# Patient Record
Sex: FEMALE | Race: BLACK OR AFRICAN AMERICAN | Employment: PART TIME | URBAN - METROPOLITAN AREA
[De-identification: names, ages, dates, MRNs, and addresses within clinical notes are randomized per-mention and may not be internally consistent; named-entity substitution may affect disease eponyms.]

---

## 2017-03-24 ENCOUNTER — HOSPITAL ENCOUNTER (EMERGENCY)
Facility: CLINIC | Age: 32
Discharge: HOME | End: 2017-03-24
Attending: EMERGENCY MEDICINE | Admitting: EMERGENCY MEDICINE
Payer: MEDICAID

## 2017-03-24 VITALS
TEMPERATURE: 98 F | SYSTOLIC BLOOD PRESSURE: 118 MMHG | HEART RATE: 69 BPM | WEIGHT: 100 LBS | DIASTOLIC BLOOD PRESSURE: 75 MMHG | RESPIRATION RATE: 16 BRPM | OXYGEN SATURATION: 98 %

## 2017-03-24 DIAGNOSIS — R51.9 FACIAL PAIN, ACUTE: ICD-10-CM

## 2017-03-24 DIAGNOSIS — K08.89 PAIN, DENTAL: ICD-10-CM

## 2017-03-24 PROCEDURE — 99282 EMERGENCY DEPT VISIT SF MDM: CPT

## 2017-03-24 RX ORDER — HYDROCODONE BITARTRATE AND ACETAMINOPHEN 5; 325 MG/1; MG/1
1-2 TABLET ORAL EVERY 4 HOURS PRN
Qty: 15 TABLET | Refills: 0 | Status: SHIPPED | OUTPATIENT
Start: 2017-03-24

## 2017-03-24 RX ORDER — IBUPROFEN 200 MG
600 TABLET ORAL EVERY 8 HOURS PRN
Qty: 1 TABLET | Refills: 0 | Status: SHIPPED | OUTPATIENT
Start: 2017-03-24

## 2017-03-24 ASSESSMENT — ENCOUNTER SYMPTOMS: FEVER: 0

## 2017-03-24 NOTE — ED AVS SNAPSHOT
[facility] Emergency Department    [address]    [facility] [address]    Phone:  [phone]    Fax:  [phone]                                       Michelle Gerber   MRN: [id number]    Department:  [facility] Emergency Department   Date of Visit:  3/24/2017           After Visit Summary Signature Page     I have received my discharge instructions, and my questions have been answered. I have discussed any challenges I see with this plan with the nurse or doctor.    ..........................................................................................................................................  Patient/Patient Representative Signature      ..........................................................................................................................................  Patient Representative Print Name and Relationship to Patient    ..................................................               ................................................  Date                                            Time    ..........................................................................................................................................  Reviewed by Signature/Title    ...................................................              ..............................................  Date                                                            Time

## 2017-03-24 NOTE — DISCHARGE INSTRUCTIONS
Discharge Instructions  Dental Pain    You have been seen today for a toothache. Your pain may be caused by an exposed nerve, an infection (pulpitis), a root abscess, or other problems. You will need to see a dentist for a solution to your tooth problem. Emergency Department care is only to help control your problem until you can see a dentist.  Today, we did not find any sign that your toothache was caused by a serious condition, but sometimes symptoms develop over time and cannot be found during an emergency visit, so it is very important that you follow up with your dentist.      Return to the Emergency Department if:    You develop a fever over 101 degrees Fahrenheit    You can t open your mouth normally, can t move your tongue well, or can t swallow    You have new or increased swelling of your face or neck.    You develop drainage of pus or foul smelling material from around your tooth.  What can I do to help myself?    Take any antibiotic the doctor may have prescribed for you today.    Avoid very hot or very cold foods as both can cause pain.    Make an appointment to see a dentist as soon as possible. If you wish, we can provide you with a list of low-cost dental clinics.       Remember that you can always come back to the Emergency Department if you are not able to see your regular doctor in the amount of time listed above, if you get any new symptoms, or if there is anything that worries you.        Dental Resources  Name/Address/Phone Eligibility Hours Fee   [facility]  [address], [address]  [place] [address]  [phone] Anyone Call for appointment [facility]  [facility]  Private Insurance   [facility]  Hygiene [facility]  [facility] [place], [place] [id number] [phone] Anyone Call for appointment    [facility] refers to low-cost dental clinics for non-preventive care    Portuguese Interpreters available Prices start at   Adults        Cleaning $36-$160         X-Ray $20-40  Children        Cleaning $15        X-ray $10-20        Fluoride $10  Accepts cash, check or credit;  Does not take insurance or MA.   [facility]  [address] [place], [place]  [id number] [phone] Anyone Afternoons and evenings    September-May    Answers phones after 10 AM $30.00 per visit   ($15.00 per visit if 62 or older)   Preventive care.  Restoration care; sliding fee; MA   [facility]  [address] [place] [address] [phone] Children birth to age 18 and pregnant women    [facility] Residents without insurance will be asked to apply for Assured Care. M TH F 8:30 am - 5 pm  T W 8:30 am - 7 pm    30 locations metro wide    Call for appointment and to confirm hours. Sliding Fee  [facility]  Medical Assistance  Assured Access  Private Insurance    8 Languages Spoken   [facility] [address] [address]  [phone] Anyone Call for appointment Sliding Fee  Accept insurance, [place],   [place] and self-pay.  Call if no insurance.    All services provided.  Staff fluent in Hmong, Laotian, Frisian, Kiswahili, Greenlandic, Arabic, and Farsi.   [facility]  [address] [place], [place] [id number] [phone] Children  Adults in a walk in basis Mon - Fri. 8 - 5 pm       (closed 1-2 pm)  General Dentists & Hygienists  Private Dentists  Dentures Fees based on family size and income ranging from 40% - 100% payment by patient.   [facility]  [address] [place], [place]  [id number] [phone] Anyone Mon - Fri 7:30 am - 5:00 pm  By Appt.    Tues & Wed @ 3:00 call for urgent care Appt for next day service Sliding fee:  MA; Insurance   [facility]  [facility]  [address] [place], [place]  [id number] [phone] Anyone Call for an appointment.  Days open vary every semester. Adult cleaning $25  Child cleaning $15  X-Rays  $10-$15  Whitening services available  $75, includes cleaning  Seniors 50% off   [facility]  [address] [place], [place]  [id number] [phone] Anyone M-F 8 am - 5 pm Most insurances accepted.  MA and Sliding Scale.   [facility]  [facility] [place], [place]  [id number] [phone] Anyone without insurance Call to make appt   M-F 9:00 am - 5 pm   (Closed noon hour 12-1)    6 pm- 8 pm Evening hours also available for care Sliding fee based on income and size of household.   [facility]  Dental Clinic  [address] [place]  [address]  [phone] press option 1    For the [facility] press option 4 Anyone              Anyone Monday  4 - 6:30 pm  Tuesday 12:30 - 3 & 4-6:30  Thursday 8:30 - 11 am & 12-2:30 pm  September through May only    All year around on Thursdays from 5-9 pm (only time a dentist is in.) Cleanings & X-Rays Only  Cleanings:  Adults $30                   Kids $20  X-Rays:  Adults $34                Kids $10    MA and Sliding fee   [facility]  [address] [place], [place] [id number] [phone] Anyone    ([employer] interpreters available) M-F 8:00 am - 5:00 pm       By appointment only  (same day appointments available) Sliding fee ($40+ may be due at appointment, remainder billed); MA; Insurance   [facility]  [address] [place], [place] [phone]   Anyone    ([employer] interpreters available) M-Th 8:00 am - 8:00 pm  F 8:00 am - 5:00 pm    By appointment only  (same day appointments available) Sliding fee ($40+ may be due at appointment, remainder billed); MA; Insurance   [facility] [place]  [address] [place], [place]  [id number] [phone] Anyone    Must live within [facility] to qualify for sliding fee services Mon, Tues, Thurs, Fri  8:30 am - 5:00 pm  Wed. 8:30 am - 7:00 pm  All other services by  appt. only Sliding Fee; MA   Offer payment plans    Have financial workers that will assist with [place] and will use sliding fee for those who do not qualify.      [facility]  [address] [place] [address]  [phone] Anyone without insurance     Hours and day of week vary  (Call ahead for hours)    Walk-in only Free Services    Cleanings; Fillings; Extractions   [address]  [address] [phone] Anyone Call for an appointment Accept patients with [place] and Medical Assistance.  10% discount if bill is paid in full on day of service.  No sliding fee scale.     [facility]  [address] [place], [place] [phone] Anyone M-F  8 am-5 pm  Call for appt.    Walk-in hours 8 am - 11am and 1 pm - 5 pm, however take scheduled appointments first    No emergency services or oral surgeries Sliding Fee available with an [place] or [place] denial letter and proof of income.    Accepts Assured Access card and MA coverage.     Name/Address/Phone Eligibility Hours Fee   [facility]  [address] [place], [place]  [phone] Anyone with emergencies only M & W clinic begins at 6 pm   Call ahead    Alternate Fridays for children by Appt only Free   [facility]  [address] [place] [address]  [phone]    Emergencies (adults only):  [phone] Anyone Free walk-in screens for oral surgery    Call ahead for hours    All other services by appt. only  Accepts MA for pediatrics only    Rates are about 25% - 40% less than private dental office.    No sliding fee scale   [facility]  [address] [place], [place]  [phone] Anyone as long as they do not have health insurance Hours on Mondays, Tuesdays, and Thursdays Sliding fees based on monthly income    No root canals, tooth pulls or emergencies   [facility]  [address] [facility] [phone] Anyone  M - F 8:00 am - 5:00 pm  Wed 8:00 am - 8 pm Sliding fees; MA; Insurance   [facility]  [address] [place]  [address]  [phone] Anyone age 55+ M - F 8:00 am - 4:30 pm    Appt. only Set slightly lower fees;  MA; Insurance         Give Kids a smile day in [facility] Children Takes place in February at a few locations                          Dental Pain:      Dear Emergency Department Patient:     Here at [facility], we are always pleased to evaluate you for emergency conditions and offer a screening examination. Today, we have seen you and determined that you have dental pain and/or a dental problem.  We do not have the equipment and/or advanced training to perform definitive dental care, therefore, you need to be seen by a dentist for further care.     You may see your regular dentist if you have one, or we have attached a list of community dental providers, including some who provide care at a reduced fee.      Please be aware that if a narcotic medication was prescribed, it will not be refilled in the emergency department.  Accordingly, if you should have ongoing problems and/or pain, you should contact a dentist right away for definitive treatment.    Sincerely,        [facility]      Opioid Medication Discharge Instructions    You have been given a prescription for an opioid (narcotic) pain medicine and/or have   received a pain medicine while here in the emergency department. These medicines can make you drowsy or impaired.     You must not drive, operate dangerous equipment, or   engage in any other dangerous activities while taking these medications. If you drive while taking these medications, you could be arrested for DUI, or driving under the   influence. Do not drink any alcohol while you are taking these medications.     Opioid pain  medications can cause addiction. If you have a history of chemical   dependency of any type, you are at a higher risk of becoming addicted to pain   medications. Only take these prescribed medications to treat your pain when all other   options have been tried. Take it for as short a time and as few doses as possible.     Store your pain pills in a secure place, as they are frequently stolen and provide a dangerous opportunity for children or visitors in your house to start abusing these powerful medications. We will not replace any lost or stolen medicine.     As soon as your pain is better, you should safely dispose of all your remaining medication.     Many prescription pain medications contain Tylenol (acetaminophen), including Vicodin, Tylenol #3, Norco, Lortab, and Percocet. You should not take any extra pills of Tylenol if you are using these prescription medications or you can get very sick. Do not ever take more than 4000 mg of acetaminophen in any 24 hour period.    All opioids tend to cause constipation. Drink plenty of water and eat foods that have   a lot of fiber, such as fruits, vegetables, prune juice, apple juice and high fiber cereal.   Take a laxative if you don t move your bowels at least every other day. Miralax, Milk of   Magnesia, Colace, or Senna can be used to keep you regular.

## 2017-03-24 NOTE — ED AVS SNAPSHOT
[facility] Emergency Department    [address] [place] [address]    Phone:  [phone]    Fax:  [phone]                                       Michelle Gerber   MRN: [id number]    Department:  [facility] Emergency Department   Date of Visit:  3/24/2017           Patient Information     Date Of Birth          1985        Your diagnoses for this visit were:     Pain, dental        You were seen by Estela Sims MD.      Follow-up Information     Follow up with Dentist Today.        Discharge Instructions         Discharge Instructions  Dental Pain    You have been seen today for a toothache. Your pain may be caused by an exposed nerve, an infection (pulpitis), a root abscess, or other problems. You will need to see a dentist for a solution to your tooth problem. Emergency Department care is only to help control your problem until you can see a dentist.  Today, we did not find any sign that your toothache was caused by a serious condition, but sometimes symptoms develop over time and cannot be found during an emergency visit, so it is very important that you follow up with your dentist.      Return to the Emergency Department if:    You develop a fever over 101 degrees Fahrenheit    You can t open your mouth normally, can t move your tongue well, or can t swallow    You have new or increased swelling of your face or neck.    You develop drainage of pus or foul smelling material from around your tooth.  What can I do to help myself?    Take any antibiotic the doctor may have prescribed for you today.    Avoid very hot or very cold foods as both can cause pain.    Make an appointment to see a dentist as soon as possible. If you wish, we can provide you with a list of low-cost dental clinics.       Remember that you can always come back to the Emergency Department if you are not able to see your regular doctor in the amount of time listed above, if you get  any new symptoms, or if there is anything that worries you.        Dental Resources  Name/Address/Phone Eligibility Hours Fee   [facility]  [address]  [place] [address]  [phone] Anyone Call for appointment [facility]  Medical Assistance  Private Insurance   [facility]  [address] [place] [address]  [phone] Anyone Call for appointment    [place] refers to low-cost dental clinics for non-preventive care    Romansh Interpreters available Prices start at   Adults        Cleaning $36-$160        X-Ray $20-40  Children        Cleaning $15        X-ray $10-20        Fluoride $10  Accepts cash, check or credit;  Does not take insurance or MA.   [facility]  [address] [place], [place]  [phone] Anyone Afternoons and evenings    September-May    Answers phones after 10 AM $30.00 per visit   ($15.00 per visit if 62 or older)   Preventive care.  Restoration care; sliding fee; MA   [facility]  [address] [place] [address]  [phone] Children birth to age 18 and pregnant women    [facility] Residents without insurance will be asked to apply for Assured Care. M TH F 8:30 am - 5 pm  T W 8:30 am - 7 pm    30 locations metro wide    Call for appointment and to confirm hours. Sliding Fee  [facility]  Medical Assistance  Assured Access  Private Insurance    8 Languages Spoken   [facility] [address] [phone] Anyone Call for appointment Sliding Fee  Accept insurance, [place],   [place] and self-pay.  Call if no insurance.    All services provided.  Staff fluent in Hmong, Laotian, Kyrgyz, Cypriot, Maori, Romansh, and Farsi.   [facility]  [address] [place], [place] [phone] Children  Adults in a walk in basis Mon - Fri. 8 - 5 pm       (closed 1-2 pm)  General  Dentists & Hygienists  Private Dentists  Dentures Fees based on family size and income ranging from 40% - 100% payment by patient.   [facility]  [address] [place]  [address]    [phone] Anyone Mon - Fri 7:30 am - 5:00 pm  By Appt.    Tues & Wed @ 3:00 call for urgent care Appt for next day service Sliding fee:  MA; Insurance   [facility]  [address] [place]  [address]  [phone] Anyone Call for an appointment.  Days open vary every semester. Adult cleaning $25  Child cleaning $15  X-Rays $10-$15  Whitening services available  $75, includes cleaning  Seniors 50% off   [facility]  [address] [place]  [address] [phone] Anyone M-F 8 am - 5 pm Most insurances accepted.  MA and Sliding Scale.   [facility]  [address] [place] [address]  [phone] Anyone without insurance Call to make appt   M-F 9:00 am - 5 pm   (Closed noon hour 12-1)    6 pm- 8 pm Evening hours also available for care Sliding fee based on income and size of household.   [facility]  [address] [place] [address]  [phone] press option 1    For the [facility] press option 4 Anyone              Anyone Monday  4 - 6:30 pm  Tuesday 12:30 - 3 & 4-6:30  Thursday 8:30 - 11 am & 12-2:30 pm  September through May only    All year around on Thursdays from 5-9 pm (only time a dentist is in.) Cleanings & X-Rays Only  Cleanings:  Adults $30                   Kids $20  X-Rays:  Adults $34                Kids $10    MA and Sliding fee   [facility]  [address] [place] [address] [phone] Anyone    ([facility] interpreters available) M-F 8:00 am - 5:00 pm       By appointment only  (same day appointments available) Sliding fee ($40+ may be due at appointment, remainder billed); MA; Insurance   Open  [facility]  [address] [place] [address]    [phone]   Anyone    (Hmong interpreters available) M-Th 8:00 am - 8:00 pm  F 8:00 am - 5:00 pm    By appointment only  (same day appointments available) Sliding fee ($40+ may be due at appointment, remainder billed); MA; Insurance   [facility]  [address] [place], [place]  [address] [phone] Anyone    Must live within [facility] to qualify for sliding fee services Mon, Tues, Thurs, Fri  8:30 am - 5:00 pm  Wed. 8:30 am - 7:00 pm  All other services by appt. only Sliding Fee; MA   Offer payment plans    Have financial workers that will assist with [place] and will use sliding fee for those who do not qualify.      [facility]  [address] [place] [phone] Anyone without insurance     Hours and day of week vary  (Call ahead for hours)    Walk-in only Free Services    Cleanings; Fillings; Extractions   [facility]  [address] [place] [address]  [phone] Anyone Call for an appointment Accept patients with [place] and Medical Assistance.  10% discount if bill is paid in full on day of service.  No sliding fee scale.     [facility]  [address] [place] [address]  [phone] Anyone M-F  8 am-5 pm  Call for appt.    Walk-in hours 8 am - 11am and 1 pm - 5 pm, however take scheduled appointments first    No emergency services or oral surgeries Sliding Fee available with an [place] or [place] denial letter and proof of income.    Accepts Assured Access card and MA coverage.     Name/Address/Phone Eligibility Hours Fee   [facility]  [address] [place], [place]  [phone] Anyone with emergencies only M & W clinic begins at 6 pm   Call ahead    Alternate Fridays for children by Appt only Free   [facility]  [address] [place]  [address]  [phone]    Emergencies (adults only):  [phone] Anyone Free walk-in screens for oral surgery    Call ahead for hours    All other services by appt. only  Accepts MA for pediatrics only    Rates are about 25% - 40% less than private dental office.    No sliding fee scale   [facility]  [address] [place] [address]  [phone] Anyone as long as they do not have health insurance Hours on Mondays, Tuesdays, and Thursdays Sliding fees based on monthly income    No root canals, tooth pulls or emergencies   [facility]  [address] [place] [phone] Anyone  M - F 8:00 am - 5:00 pm  Wed 8:00 am - 8 pm Sliding fees; MA; Insurance   [facility] [address]  [phone] Anyone age 55+ M - F 8:00 am - 4:30 pm    Appt. only Set slightly lower fees;  MA; Insurance         Give Kids a smile day in [facility] Children Takes place in February at a few locations                          Dental Pain:      Dear Emergency Department Patient:     Here at [facility], we are always pleased to evaluate you for emergency conditions and offer a screening examination. Today, we have seen you and determined that you have dental pain and/or a dental problem.  We do not have the equipment and/or advanced training to perform definitive dental care, therefore, you need to be seen by a dentist for further care.     You may see your regular dentist if you have one, or we have attached a list of community dental providers, including some who provide care at a reduced fee.      Please be aware that if a narcotic medication was prescribed, it will not be refilled in the emergency department.  Accordingly, if you should have ongoing problems and/or pain, you should contact a dentist right away for definitive treatment.    Sincerely,        [facility]  ([facility])      Opioid Medication Discharge Instructions    You have been given a prescription for an opioid (narcotic) pain medicine and/or have   received a pain medicine while here in the emergency department. These medicines can make you drowsy or impaired.     You must not drive, operate dangerous equipment, or   engage in any other dangerous activities while taking these medications. If you drive while taking these medications, you could be arrested for DUI, or driving under the   influence. Do not drink any alcohol while you are taking these medications.     Opioid pain medications can cause addiction. If you have a history of chemical   dependency of any type, you are at a higher risk of becoming addicted to pain   medications. Only take these prescribed medications to treat your pain when all other   options have been tried. Take it for as short a time and as few doses as possible.     Store your pain pills in a secure place, as they are frequently stolen and provide a dangerous opportunity for children or visitors in your house to start abusing these powerful medications. We will not replace any lost or stolen medicine.     As soon as your pain is better, you should safely dispose of all your remaining medication.     Many prescription pain medications contain Tylenol (acetaminophen), including Vicodin, Tylenol #3, Norco, Lortab, and Percocet. You should not take any extra pills of Tylenol if you are using these prescription medications or you can get very sick. Do not ever take more than 4000 mg of acetaminophen in any 24 hour period.    All opioids tend to cause constipation. Drink plenty of water and eat foods that have   a lot of fiber, such as fruits, vegetables, prune juice, apple juice and high fiber cereal.   Take a laxative if you don t move your bowels at least every other day. Miralax, Milk of   Magnesia, Colace, or Senna can be used to keep you regular.          24 Hour Appointment Hotline       To  make an appointment at any [facility], call [phone] ([phone]). If you don't have a family doctor or clinic, we will help you find one. [facility] clinics are conveniently located to serve the needs of you and your family.             Review of your medicines      START taking        Dose / Directions Last dose taken    HYDROcodone-acetaminophen 5-325 MG per tablet   Commonly known as:  NORCO   Dose:  1-2 tablet   Quantity:  15 tablet        Take 1-2 tablets by mouth every 4 hours as needed for moderate to severe pain   Refills:  0        ibuprofen 200 MG tablet   Commonly known as:  ADVIL/MOTRIN   Dose:  600 mg   Quantity:  1 tablet        Take 3 tablets (600 mg) by mouth every 8 hours as needed for mild pain   Refills:  0          Our records show that you are taking the medicines listed below. If these are incorrect, please call your family doctor or clinic.        Dose / Directions Last dose taken    ferrous sulfate 325 (65 FE) MG tablet   Commonly known as:  IRON   Dose:  325 mg   Quantity:  100 tablet        Take 1 tablet (325 mg) by mouth 3 times daily (with meals)   Refills:  1        polyethylene glycol Packet   Commonly known as:  MIRALAX/GLYCOLAX   Dose:  17 g   Quantity:  60 packet        Take 17 g by mouth daily   Refills:  3        prenatal multivitamin  plus iron 27-0.8 MG Tabs per tablet   Dose:  1 tablet        Take 1 tablet by mouth daily   Refills:  0        senna-docusate 8.6-50 MG per tablet   Commonly known as:  SENOKOT-S;PERICOLACE   Dose:  1-2 tablet   Quantity:  60 tablet        Take 1-2 tablets by mouth 2 times daily   Refills:  2                Prescriptions were sent or printed at these locations (2 Prescriptions)                   Other Prescriptions                Printed at Department/Unit printer (2 of 2)         ibuprofen (ADVIL/MOTRIN) 200 MG tablet               HYDROcodone-acetaminophen (NORCO) 5-325 MG per tablet                Orders Needing Specimen Collection      None      Pending Results     No orders found from 3/22/2017 to 3/25/2017.            Pending Culture Results     No orders found from 3/22/2017 to 3/25/2017.             Test Results from your hospital stay            Clinical Quality Measure: Blood Pressure Screening     Your blood pressure was checked while you were in the emergency department today. The last reading we obtained was  BP: 136/78 . Please read the guidelines below about what these numbers mean and what you should do about them.  If your systolic blood pressure (the top number) is less than 120 and your diastolic blood pressure (the bottom number) is less than 80, then your blood pressure is normal. There is nothing more that you need to do about it.  If your systolic blood pressure (the top number) is 120-139 or your diastolic blood pressure (the bottom number) is 80-89, your blood pressure may be higher than it should be. You should have your blood pressure rechecked within a year by a primary care provider.  If your systolic blood pressure (the top number) is 140 or greater or your diastolic blood pressure (the bottom number) is 90 or greater, you may have high blood pressure. High blood pressure is treatable, but if left untreated over time it can put you at risk for heart attack, stroke, or kidney failure. You should have your blood pressure rechecked by a primary care provider within the next 4 weeks.  If your provider in the emergency department today gave you specific instructions to follow-up with your doctor or provider even sooner than that, you should follow that instruction and not wait for up to 4 weeks for your follow-up visit.        Thank you for choosing [facility]       Thank you for choosing [facility] for your care. Our goal is always to provide you with excellent care. Hearing back from our patients is one way we can continue to improve our services. Please take a few minutes to complete the written survey that you may receive  "in the mail after you visit with us. Thank you!        [facility] Information     [facility] lets you send messages to your doctor, view your test results, renew your prescriptions, schedule appointments and more. To sign up, go to [email or web address] . Click on \"Log in\" on the left side of the screen, which will take you to the Welcome page. Then click on \"Sign up Now\" on the right side of the page.     You will be asked to enter the access code listed below, as well as some personal information. Please follow the directions to create your username and password.     Your access code is: [email or web address]  Expires: 2017  2:06 AM     Your access code will  in 90 days. If you need help or a new code, please call your [facility] clinic or [phone].        Care EveryWhere ID     This is your Care EveryWhere ID. This could be used by other organizations to access your [facility] medical records  [id number]        After Visit Summary       This is your record. Keep this with you and show to your community pharmacist(s) and doctor(s) at your next visit.                  "

## 2017-03-24 NOTE — ED PROVIDER NOTES
History     Chief Complaint:  Dental Pain    HPI   Michelle Gerber is a 32 year old female who presents to the emergency department today for evaluation of dental pain. The patient reports that she has right lower dental pain. The patient reports that she has had mild pain for a while but she has not seen a dentist for her pain. Tonight, her pain became much worse so she drove herself here to the emergency department. The patient reports that she took 800 mg Ibuprofen at 0000 and notes that this helped slightly with her pain. The patient denies any fever or any trauma to her tooth.     Allergies:  No Known Drug Allergies    Medications:    Iron  Miralax/Glycolax   Senna-docusate   Prenatal Multivitamin     Past Medical History:    History reviewed. No pertinent past medical history.    Past Surgical History:    History reviewed. No pertinent surgical history.    Family History:    No family history on file.    Social History:  Smoking Status: Never Smoker  Smokeless Tobacco: Never Used  Alcohol Use: Negative   Marital Status:   [2]     Review of Systems   Constitutional: Negative for fever.   HENT: Positive for dental problem (Pain, right lower mouth).    All other systems reviewed and are negative.    Physical Exam   Vitals:  Patient Vitals for the past 24 hrs:   BP Temp Temp src Pulse Resp SpO2 Weight   03/24/17 0054 136/78 98  F (36.7  C) Temporal 69 16 98 % 45.4 kg (100 lb)      Physical Exam  Nursing note and vitals reviewed.    Constitutional: Pleasant and well groomed. Appears uncomfortable. Reluctant to talk due to pain.          HENT: Right lower 3rd molar is severely decayed. No adjacent swelling or fluctuance.No facial swelling/trismus. Floor of mouth soft. No cervical adenopathy.     Eyes: Conjunctivae normal are normal. No scleral icterus.   Cardiovascular: Peripheral pulse with normal rate, regular rhythm. Intact distal pulses. No mumur  Pulmonary/Chest: Effort normal    Neurological:Alert.  Coordination normal.   Skin: Skin is warm and dry.   Psychiatric: Normal mood and affect.     Emergency Department Course     Procedures:  I placed Temrex Temporary Cement on the right lower 3rd molar which is severely decayed    Emergency Department Course:  Nursing notes and vitals reviewed.  I performed an exam of the patient as documented above.   I discussed the treatment plan with the patient. They expressed understanding of this plan and consented to discharge. They will be discharged home with instructions for care and follow up. In addition, the patient will return to the emergency department if their symptoms persist, worsen, if new symptoms arise or if there is any concern.  All questions were answered.  I personally spoke with the patient and answered all related questions prior to discharge.  Impression & Plan      Medical Decision Making:  The patient presents with mouth/face pain that seems to originate with a tooth.  There is no swelling or fluctuance to suggest an abscess that would benefit from  incision and drainage.   The differential diagnosis includes, but is not limited to; pulpitis, periapical abscess, fractured tooth. There is no evidence of buccinator/canine space infections, significant facial swelling, or signs of Jasper's angina.     I have instructed the patient to return to the ED with fever, facial swelling, other new or worse symptoms.  I have stressed the importance of  follow up with a dentist/endodontist as soon as possible for definitive management of their dental problem. The patient was provided with community dental resources and our dental pain policy. She understands that we will not provide additional prescriptions for narcotic medications. .       Plan:  1. Return to the ED  with new or worse symptoms  2. Follow up with a dentist for definitive management  3. Provided with standard [facility] Discharge instructions for Dental Pain  4. Prescriptions for Ibuprofen and Norco were  provided.     Diagnosis:    ICD-10-CM    1. Pain, dental K08.89      Disposition:   The patient is discharged to home.    Discharge Medications:  New Prescriptions    HYDROCODONE-ACETAMINOPHEN (NORCO) 5-325 MG PER TABLET    Take 1-2 tablets by mouth every 4 hours as needed for moderate to severe pain    IBUPROFEN (ADVIL/MOTRIN) 200 MG TABLET    Take 3 tablets (600 mg) by mouth every 8 hours as needed for mild pain     Scribe Disclosure:  I, Romie Palomo, am serving as a scribe at 1:50 AM on 3/24/2017 to document services personally performed by Estela Sims, based on my observations and the provider's statements to me.    3/24/2017   [facility] EMERGENCY DEPARTMENT       Estela Sims MD  03/25/17 4648

## 2020-08-17 ENCOUNTER — RECORDS - HEALTHEAST (OUTPATIENT)
Dept: ADMINISTRATIVE | Facility: OTHER | Age: 35
End: 2020-08-17

## 2020-12-15 ENCOUNTER — APPOINTMENT (OUTPATIENT)
Dept: GENERAL RADIOLOGY | Facility: CLINIC | Age: 35
End: 2020-12-15
Attending: PHYSICIAN ASSISTANT
Payer: COMMERCIAL

## 2020-12-15 ENCOUNTER — APPOINTMENT (OUTPATIENT)
Dept: ULTRASOUND IMAGING | Facility: CLINIC | Age: 35
End: 2020-12-15
Attending: PHYSICIAN ASSISTANT
Payer: COMMERCIAL

## 2020-12-15 ENCOUNTER — HOSPITAL ENCOUNTER (EMERGENCY)
Facility: CLINIC | Age: 35
Discharge: HOME | End: 2020-12-16
Attending: PHYSICIAN ASSISTANT | Admitting: PHYSICIAN ASSISTANT
Payer: COMMERCIAL

## 2020-12-15 DIAGNOSIS — R10.9 ACUTE ABDOMINAL PAIN: ICD-10-CM

## 2020-12-15 LAB
ALBUMIN SERPL-MCNC: 3.2 G/DL (ref 3.4–5)
ALBUMIN UR-MCNC: NEGATIVE MG/DL
ALP SERPL-CCNC: 55 U/L (ref 40–150)
ALT SERPL W P-5'-P-CCNC: 15 U/L (ref 0–50)
ANION GAP SERPL CALCULATED.3IONS-SCNC: 4 MMOL/L (ref 3–14)
APPEARANCE UR: CLEAR
AST SERPL W P-5'-P-CCNC: 17 U/L (ref 0–45)
BASOPHILS # BLD AUTO: 0.1 10E9/L (ref 0–0.2)
BASOPHILS NFR BLD AUTO: 1.2 %
BILIRUB SERPL-MCNC: 0.5 MG/DL (ref 0.2–1.3)
BILIRUB UR QL STRIP: NEGATIVE
BUN SERPL-MCNC: 11 MG/DL (ref 7–30)
CALCIUM SERPL-MCNC: 8.8 MG/DL (ref 8.5–10.1)
CHLORIDE SERPL-SCNC: 104 MMOL/L (ref 94–109)
CO2 SERPL-SCNC: 30 MMOL/L (ref 20–32)
COLOR UR AUTO: NORMAL
CREAT SERPL-MCNC: 0.72 MG/DL (ref 0.52–1.04)
DIFFERENTIAL METHOD BLD: NORMAL
EOSINOPHIL # BLD AUTO: 0.1 10E9/L (ref 0–0.7)
EOSINOPHIL NFR BLD AUTO: 1.2 %
ERYTHROCYTE [DISTWIDTH] IN BLOOD BY AUTOMATED COUNT: 13.2 % (ref 10–15)
GFR SERPL CREATININE-BSD FRML MDRD: >90 ML/MIN/{1.73_M2}
GLUCOSE SERPL-MCNC: 104 MG/DL (ref 70–99)
GLUCOSE UR STRIP-MCNC: NEGATIVE MG/DL
HCG UR QL: NEGATIVE
HCT VFR BLD AUTO: 41.3 % (ref 35–47)
HGB BLD-MCNC: 13.2 G/DL (ref 11.7–15.7)
HGB UR QL STRIP: NEGATIVE
IMM GRANULOCYTES # BLD: 0 10E9/L (ref 0–0.4)
IMM GRANULOCYTES NFR BLD: 0.2 %
KETONES UR STRIP-MCNC: NEGATIVE MG/DL
LEUKOCYTE ESTERASE UR QL STRIP: NEGATIVE
LIPASE SERPL-CCNC: 103 U/L (ref 73–393)
LYMPHOCYTES # BLD AUTO: 1.8 10E9/L (ref 0.8–5.3)
LYMPHOCYTES NFR BLD AUTO: 45.5 %
MCH RBC QN AUTO: 30.9 PG (ref 26.5–33)
MCHC RBC AUTO-ENTMCNC: 32 G/DL (ref 31.5–36.5)
MCV RBC AUTO: 97 FL (ref 78–100)
MONOCYTES # BLD AUTO: 0.3 10E9/L (ref 0–1.3)
MONOCYTES NFR BLD AUTO: 8.5 %
NEUTROPHILS # BLD AUTO: 1.7 10E9/L (ref 1.6–8.3)
NEUTROPHILS NFR BLD AUTO: 43.4 %
NITRATE UR QL: NEGATIVE
NRBC # BLD AUTO: 0 10*3/UL
NRBC BLD AUTO-RTO: 0 /100
PH UR STRIP: 5.5 PH (ref 5–7)
PLATELET # BLD AUTO: 204 10E9/L (ref 150–450)
POTASSIUM SERPL-SCNC: 3.3 MMOL/L (ref 3.4–5.3)
PROT SERPL-MCNC: 7 G/DL (ref 6.8–8.8)
RBC # BLD AUTO: 4.27 10E12/L (ref 3.8–5.2)
SODIUM SERPL-SCNC: 138 MMOL/L (ref 133–144)
SOURCE: NORMAL
SP GR UR STRIP: 1 (ref 1–1.03)
UROBILINOGEN UR STRIP-MCNC: NORMAL MG/DL (ref 0–2)
WBC # BLD AUTO: 4 10E9/L (ref 4–11)

## 2020-12-15 PROCEDURE — 81003 URINALYSIS AUTO W/O SCOPE: CPT | Performed by: PHYSICIAN ASSISTANT

## 2020-12-15 PROCEDURE — 85025 COMPLETE CBC W/AUTO DIFF WBC: CPT | Performed by: PHYSICIAN ASSISTANT

## 2020-12-15 PROCEDURE — 80053 COMPREHEN METABOLIC PANEL: CPT | Performed by: PHYSICIAN ASSISTANT

## 2020-12-15 PROCEDURE — 74018 RADEX ABDOMEN 1 VIEW: CPT

## 2020-12-15 PROCEDURE — 83690 ASSAY OF LIPASE: CPT | Performed by: PHYSICIAN ASSISTANT

## 2020-12-15 PROCEDURE — 99285 EMERGENCY DEPT VISIT HI MDM: CPT | Mod: 25

## 2020-12-15 PROCEDURE — 81025 URINE PREGNANCY TEST: CPT | Performed by: PHYSICIAN ASSISTANT

## 2020-12-15 PROCEDURE — 76856 US EXAM PELVIC COMPLETE: CPT

## 2020-12-15 ASSESSMENT — ENCOUNTER SYMPTOMS
FREQUENCY: 0
DIARRHEA: 0
CONSTIPATION: 1
ABDOMINAL PAIN: 1
HEMATURIA: 0
COUGH: 0
DYSURIA: 0
VOMITING: 0

## 2020-12-15 NOTE — ED AVS SNAPSHOT
[facility] Emergency Dept  [address]  [facility] [address]  Phone: [phone]  Fax: [phone]                                    Michelle Gerber   MRN: [id number]    Department: [facility] Emergency Dept   Date of Visit: 12/15/2020           After Visit Summary Signature Page    I have received my discharge instructions, and my questions have been answered. I have discussed any challenges I see with this plan with the nurse or doctor.    ..........................................................................................................................................  Patient/Patient Representative Signature      ..........................................................................................................................................  Patient Representative Print Name and Relationship to Patient    ..................................................               ................................................  Date                                   Time    ..........................................................................................................................................  Reviewed by Signature/Title    ...................................................              ..............................................  Date                                               Time          22EPIC Rev 08/18

## 2020-12-16 VITALS
DIASTOLIC BLOOD PRESSURE: 70 MMHG | TEMPERATURE: 98.8 F | WEIGHT: 109 LBS | OXYGEN SATURATION: 100 % | HEART RATE: 75 BPM | RESPIRATION RATE: 18 BRPM | SYSTOLIC BLOOD PRESSURE: 104 MMHG

## 2020-12-16 RX ORDER — POLYETHYLENE GLYCOL 3350 17 G/17G
1 POWDER, FOR SOLUTION ORAL DAILY
Qty: 510 G | Refills: 0 | Status: SHIPPED | OUTPATIENT
Start: 2020-12-16 | End: 2021-01-15

## 2020-12-16 NOTE — ED NOTES
Patient reports left flank and abdominal pain for the last 2 years.  Patient states that she feels pain and a lot of pressure that is typically worse in the morning. Patient states she doesn't think she has had a BM since last week and frequently has constipation.

## 2020-12-16 NOTE — ED PROVIDER NOTES
"  History   Chief Complaint:  Abdominal pain     HPI   Michelle Gerber is a 35 year old female who presents with abdominal pain. She complains of left sided abdominal pain that has been ongoing for the last month, describing the pain as \"pressure\". She reports that the patient is worse in the morning and she has been treating is with massaging, with often results in belching. The patient rated her pain as 4/5 this morning, but has worsened to 7/10 currently. She notes that she has been constipated since last week. She is currently on her menstrual period. The patient denies any dysuria, hematuria, frequency, vomiting, diarrhea, or cough. The abdominal pain she is experiencing now has been occurring for more than 1 year and she has undergone multiple outpatient evaluations for this.      Review of Systems   Respiratory: Negative for cough.    Gastrointestinal: Positive for abdominal pain and constipation. Negative for diarrhea and vomiting.   Genitourinary: Negative for dysuria, frequency and hematuria.   All other systems reviewed and are negative.    Allergies:  The patient has no known allergies.     Medications:  The patient is not currently taking any prescribed medications.     Past Medical History:    Placental abruption   Anemia  Underweight     Past Surgical History:    Female circumcision    section     Social History:  Tobacco use: no     Physical Exam     Patient Vitals for the past 24 hrs:   BP Temp Temp src Pulse Resp SpO2 Weight   12/15/20 2051 -- -- -- -- -- -- 49.4 kg (109 lb)   12/15/20 2040 105/56 98.8  F (37.1  C) Temporal 67 18 100 % --     Physical Exam  Vitals signs and nursing note reviewed.   Constitutional:       General: She is not in acute distress.     Appearance: She is not diaphoretic.   HENT:      Head: Normocephalic and atraumatic.   Eyes:      General: No scleral icterus.  Cardiovascular:      Rate and Rhythm: Normal rate and regular rhythm.      Pulses: Normal pulses. "   Pulmonary:      Effort: Pulmonary effort is normal. No respiratory distress.   Abdominal:      General: There is no distension.      Palpations: Abdomen is soft. There is no mass.      Tenderness: There is no abdominal tenderness. There is no guarding or rebound.      Hernia: No hernia is present.   Musculoskeletal:         General: No tenderness.   Skin:     General: Skin is warm.      Findings: No rash.   Neurological:      Mental Status: She is alert.       Emergency Department Course   Imaging:  Abdomen XR 1 vw  IMPRESSION: Moderate stool throughout the colon to the level of the rectum. No evidence for obstruction. No free air. Bony structures normal. No evidence for renal stones.   As read by Radiology.    US Pelvic Complete with Transvaginal   IMPRESSION:  1.  Unremarkable pelvic ultrasound.  As read by Radiology.    Laboratory:  CBC: WBC: 4.0, HGB: 13.2, PLT: 204  CMP: Glucose 104 (H), Potassium: 3.3 (L), Albumin: 3.2 (L), o/w WNL (Creatinine: 0.72)  UA: All Negative  Lipase: 103  HCG Qualitative Urine: negative       Emergency Department Course:    Reviewed:  I reviewed the patient's nursing notes, vitals, past medical records, Care Everywhere.     Assessments:  2044: I performed an exam of the patient as documented above.    2226: I rechecked the patient. Explained findings to patient, who expressed desire for US after a concerning read last year.     Disposition:  The patient was discharged to home.       Impression & Plan   Medical Decision Making:  They presents for evaluation of abdominal pain. Her vital signs are reassuring without hypotension, tachycardia, and are afebrile. Clinically they have a benign abdominal examination and there is a low suspicion for acute intra abdominal catastrophe. She voices pain having been chronic for some years now without character of pain. She voices diagnosis of constipation in the past and this is the likely etiology.   Diagnostic labs demonstrate no changes to  raise suspicion for biliary tree pathology, hepatitis, pancreatitis. Metabolic panel is without changes of metabolic acidosis, anion gap acidosis, acute kidney injury or renal insufficiency, or concerning electrolyte disturbance warranting intervention.  UA demonstrates no changes reflective of UTI, kidney stone. Pregnancy test obtained to evaluate for ectopic pregnancy or pregnancy is unremarkable.   I attempted to discharge the patient to outpatient care. She voices having an abnormal US of her uterus and she is concerned there may be cancer or an abnormality of her uterus that required biopsy in the past. This prompted performance of US imaging which was unremarkable.   Outpatient symptomatic management discussed, patient discharged with precautions to return to the ED     Diagnosis:    ICD-10-CM    1. Acute abdominal pain  R10.9      Discharge Medications:  New Prescriptions    No medications on file       Scribe Disclosure:  I, Blossom Rangel, am serving as a scribe at 8:44 PM on 12/15/2020 to document services personally performed by Boaz Taveras PA-C based on my observations and the provider's statements to me.          Boaz Taveras PA-C  12/16/20 0055

## 2020-12-16 NOTE — ED TRIAGE NOTES
"Pain in left side started two years ago. Intermittent. This time has been ongoing for two months. Says pain starts left lower quadrant and moves to left upper quadrant under rib cage. \"feels like gas\" and sometimes feels better when burp. Hx of constipation and says when she poops she feels better too.   "